# Patient Record
Sex: MALE | Race: WHITE | NOT HISPANIC OR LATINO | Employment: OTHER | URBAN - METROPOLITAN AREA
[De-identification: names, ages, dates, MRNs, and addresses within clinical notes are randomized per-mention and may not be internally consistent; named-entity substitution may affect disease eponyms.]

---

## 2022-09-05 ENCOUNTER — HOSPITAL ENCOUNTER (OUTPATIENT)
Dept: RADIOLOGY | Facility: MEDICAL CENTER | Age: 77
End: 2022-09-05

## 2022-09-05 ENCOUNTER — APPOINTMENT (OUTPATIENT)
Dept: RADIOLOGY | Facility: MEDICAL CENTER | Age: 77
DRG: 357 | End: 2022-09-05
Attending: STUDENT IN AN ORGANIZED HEALTH CARE EDUCATION/TRAINING PROGRAM
Payer: COMMERCIAL

## 2022-09-05 ENCOUNTER — HOSPITAL ENCOUNTER (INPATIENT)
Facility: MEDICAL CENTER | Age: 77
LOS: 2 days | DRG: 357 | End: 2022-09-07
Attending: STUDENT IN AN ORGANIZED HEALTH CARE EDUCATION/TRAINING PROGRAM | Admitting: STUDENT IN AN ORGANIZED HEALTH CARE EDUCATION/TRAINING PROGRAM
Payer: COMMERCIAL

## 2022-09-05 PROBLEM — E78.5 DYSLIPIDEMIA: Status: ACTIVE | Noted: 2022-09-05

## 2022-09-05 PROBLEM — I10 HYPERTENSION: Status: ACTIVE | Noted: 2022-09-05

## 2022-09-05 PROBLEM — K92.2 LOWER GI BLEED: Status: ACTIVE | Noted: 2022-09-05

## 2022-09-05 LAB
ABO + RH BLD: NORMAL
ABO GROUP BLD: NORMAL
ANION GAP SERPL CALC-SCNC: 11 MMOL/L (ref 7–16)
BASOPHILS # BLD AUTO: 0.3 % (ref 0–1.8)
BASOPHILS # BLD: 0.04 K/UL (ref 0–0.12)
BLD GP AB SCN SERPL QL: NORMAL
BUN SERPL-MCNC: 13 MG/DL (ref 8–22)
CALCIUM SERPL-MCNC: 8.7 MG/DL (ref 8.5–10.5)
CFT BLD TEG: 3.8 MIN (ref 4.6–9.1)
CFT P HPASE BLD TEG: 3.7 MIN (ref 4.3–8.3)
CHLORIDE SERPL-SCNC: 100 MMOL/L (ref 96–112)
CLOT ANGLE BLD TEG: 77 DEGREES (ref 63–78)
CLOT LYSIS 30M P MA LENFR BLD TEG: 0.5 % (ref 0–2.6)
CO2 SERPL-SCNC: 23 MMOL/L (ref 20–33)
CREAT SERPL-MCNC: 0.59 MG/DL (ref 0.5–1.4)
CT.EXTRINSIC BLD ROTEM: 0.9 MIN (ref 0.8–2.1)
EOSINOPHIL # BLD AUTO: 0.07 K/UL (ref 0–0.51)
EOSINOPHIL NFR BLD: 0.6 % (ref 0–6.9)
ERYTHROCYTE [DISTWIDTH] IN BLOOD BY AUTOMATED COUNT: 44.8 FL (ref 35.9–50)
GFR SERPLBLD CREATININE-BSD FMLA CKD-EPI: 100 ML/MIN/1.73 M 2
GLUCOSE SERPL-MCNC: 130 MG/DL (ref 65–99)
HCT VFR BLD AUTO: 32.4 % (ref 42–52)
HGB BLD-MCNC: 10.9 G/DL (ref 14–18)
IMM GRANULOCYTES # BLD AUTO: 0.06 K/UL (ref 0–0.11)
IMM GRANULOCYTES NFR BLD AUTO: 0.5 % (ref 0–0.9)
INR PPP: 1.08 (ref 0.87–1.13)
LACTATE SERPL-SCNC: 1.8 MMOL/L (ref 0.5–2)
LYMPHOCYTES # BLD AUTO: 1.85 K/UL (ref 1–4.8)
LYMPHOCYTES NFR BLD: 15.8 % (ref 22–41)
MAGNESIUM SERPL-MCNC: 1.8 MG/DL (ref 1.5–2.5)
MCF BLD TEG: 63.2 MM (ref 52–69)
MCF.PLATELET INHIB BLD ROTEM: 22.2 MM (ref 15–32)
MCH RBC QN AUTO: 31.2 PG (ref 27–33)
MCHC RBC AUTO-ENTMCNC: 33.6 G/DL (ref 33.7–35.3)
MCV RBC AUTO: 92.8 FL (ref 81.4–97.8)
MONOCYTES # BLD AUTO: 0.68 K/UL (ref 0–0.85)
MONOCYTES NFR BLD AUTO: 5.8 % (ref 0–13.4)
NEUTROPHILS # BLD AUTO: 9.01 K/UL (ref 1.82–7.42)
NEUTROPHILS NFR BLD: 77 % (ref 44–72)
NRBC # BLD AUTO: 0 K/UL
NRBC BLD-RTO: 0 /100 WBC
PHOSPHATE SERPL-MCNC: 2.3 MG/DL (ref 2.5–4.5)
PLATELET # BLD AUTO: 258 K/UL (ref 164–446)
PMV BLD AUTO: 8.5 FL (ref 9–12.9)
POTASSIUM SERPL-SCNC: 3.8 MMOL/L (ref 3.6–5.5)
PROTHROMBIN TIME: 13.8 SEC (ref 12–14.6)
RBC # BLD AUTO: 3.49 M/UL (ref 4.7–6.1)
RH BLD: NORMAL
SODIUM SERPL-SCNC: 134 MMOL/L (ref 135–145)
TEG ALGORITHM TGALG: ABNORMAL
WBC # BLD AUTO: 11.7 K/UL (ref 4.8–10.8)

## 2022-09-05 PROCEDURE — 84100 ASSAY OF PHOSPHORUS: CPT

## 2022-09-05 PROCEDURE — 85576 BLOOD PLATELET AGGREGATION: CPT

## 2022-09-05 PROCEDURE — 86850 RBC ANTIBODY SCREEN: CPT

## 2022-09-05 PROCEDURE — 36245 INS CATH ABD/L-EXT ART 1ST: CPT | Mod: XS

## 2022-09-05 PROCEDURE — 86900 BLOOD TYPING SEROLOGIC ABO: CPT

## 2022-09-05 PROCEDURE — 99223 1ST HOSP IP/OBS HIGH 75: CPT | Mod: AI | Performed by: HOSPITALIST

## 2022-09-05 PROCEDURE — 04LB3DZ OCCLUSION OF INFERIOR MESENTERIC ARTERY WITH INTRALUMINAL DEVICE, PERCUTANEOUS APPROACH: ICD-10-PCS | Performed by: STUDENT IN AN ORGANIZED HEALTH CARE EDUCATION/TRAINING PROGRAM

## 2022-09-05 PROCEDURE — B4141ZZ FLUOROSCOPY OF SUPERIOR MESENTERIC ARTERY USING LOW OSMOLAR CONTRAST: ICD-10-PCS | Performed by: STUDENT IN AN ORGANIZED HEALTH CARE EDUCATION/TRAINING PROGRAM

## 2022-09-05 PROCEDURE — 83735 ASSAY OF MAGNESIUM: CPT

## 2022-09-05 PROCEDURE — 85347 COAGULATION TIME ACTIVATED: CPT

## 2022-09-05 PROCEDURE — 83605 ASSAY OF LACTIC ACID: CPT

## 2022-09-05 PROCEDURE — 85025 COMPLETE CBC W/AUTO DIFF WBC: CPT

## 2022-09-05 PROCEDURE — 770020 HCHG ROOM/CARE - TELE (206)

## 2022-09-05 PROCEDURE — 99153 MOD SED SAME PHYS/QHP EA: CPT

## 2022-09-05 PROCEDURE — 700105 HCHG RX REV CODE 258: Performed by: HOSPITALIST

## 2022-09-05 PROCEDURE — 700105 HCHG RX REV CODE 258: Performed by: STUDENT IN AN ORGANIZED HEALTH CARE EDUCATION/TRAINING PROGRAM

## 2022-09-05 PROCEDURE — 700111 HCHG RX REV CODE 636 W/ 250 OVERRIDE (IP)

## 2022-09-05 PROCEDURE — 75726 ARTERY X-RAYS ABDOMEN: CPT | Mod: XS

## 2022-09-05 PROCEDURE — 86901 BLOOD TYPING SEROLOGIC RH(D): CPT

## 2022-09-05 PROCEDURE — 700117 HCHG RX CONTRAST REV CODE 255: Performed by: STUDENT IN AN ORGANIZED HEALTH CARE EDUCATION/TRAINING PROGRAM

## 2022-09-05 PROCEDURE — 85384 FIBRINOGEN ACTIVITY: CPT

## 2022-09-05 PROCEDURE — 36415 COLL VENOUS BLD VENIPUNCTURE: CPT

## 2022-09-05 PROCEDURE — 80048 BASIC METABOLIC PNL TOTAL CA: CPT

## 2022-09-05 PROCEDURE — 85610 PROTHROMBIN TIME: CPT

## 2022-09-05 RX ORDER — OXYCODONE HYDROCHLORIDE 5 MG/1
2.5 TABLET ORAL
Status: DISCONTINUED | OUTPATIENT
Start: 2022-09-05 | End: 2022-09-07 | Stop reason: HOSPADM

## 2022-09-05 RX ORDER — ACETAMINOPHEN 325 MG/1
650 TABLET ORAL EVERY 6 HOURS PRN
Status: DISCONTINUED | OUTPATIENT
Start: 2022-09-05 | End: 2022-09-07 | Stop reason: HOSPADM

## 2022-09-05 RX ORDER — ONDANSETRON 4 MG/1
4 TABLET, ORALLY DISINTEGRATING ORAL EVERY 4 HOURS PRN
Status: DISCONTINUED | OUTPATIENT
Start: 2022-09-05 | End: 2022-09-07 | Stop reason: HOSPADM

## 2022-09-05 RX ORDER — SODIUM CHLORIDE 9 MG/ML
500 INJECTION, SOLUTION INTRAVENOUS ONCE
Status: COMPLETED | OUTPATIENT
Start: 2022-09-05 | End: 2022-09-05

## 2022-09-05 RX ORDER — MIDAZOLAM HYDROCHLORIDE 1 MG/ML
INJECTION INTRAMUSCULAR; INTRAVENOUS
Status: COMPLETED
Start: 2022-09-05 | End: 2022-09-05

## 2022-09-05 RX ORDER — OXYCODONE HYDROCHLORIDE 5 MG/1
5 TABLET ORAL
Status: DISCONTINUED | OUTPATIENT
Start: 2022-09-05 | End: 2022-09-06

## 2022-09-05 RX ORDER — BISACODYL 10 MG
10 SUPPOSITORY, RECTAL RECTAL
Status: DISCONTINUED | OUTPATIENT
Start: 2022-09-05 | End: 2022-09-07 | Stop reason: HOSPADM

## 2022-09-05 RX ORDER — POLYETHYLENE GLYCOL 3350 17 G/17G
1 POWDER, FOR SOLUTION ORAL
Status: DISCONTINUED | OUTPATIENT
Start: 2022-09-05 | End: 2022-09-07 | Stop reason: HOSPADM

## 2022-09-05 RX ORDER — MORPHINE SULFATE 4 MG/ML
2 INJECTION INTRAVENOUS
Status: DISCONTINUED | OUTPATIENT
Start: 2022-09-05 | End: 2022-09-06

## 2022-09-05 RX ORDER — IODIXANOL 270 MG/ML
80 INJECTION, SOLUTION INTRAVASCULAR ONCE
Status: COMPLETED | OUTPATIENT
Start: 2022-09-05 | End: 2022-09-05

## 2022-09-05 RX ORDER — SODIUM CHLORIDE, SODIUM LACTATE, POTASSIUM CHLORIDE, CALCIUM CHLORIDE 600; 310; 30; 20 MG/100ML; MG/100ML; MG/100ML; MG/100ML
INJECTION, SOLUTION INTRAVENOUS CONTINUOUS
Status: DISCONTINUED | OUTPATIENT
Start: 2022-09-05 | End: 2022-09-07

## 2022-09-05 RX ORDER — AMOXICILLIN 250 MG
2 CAPSULE ORAL 2 TIMES DAILY
Status: DISCONTINUED | OUTPATIENT
Start: 2022-09-05 | End: 2022-09-07 | Stop reason: HOSPADM

## 2022-09-05 RX ORDER — ONDANSETRON 2 MG/ML
4 INJECTION INTRAMUSCULAR; INTRAVENOUS EVERY 4 HOURS PRN
Status: DISCONTINUED | OUTPATIENT
Start: 2022-09-05 | End: 2022-09-07 | Stop reason: HOSPADM

## 2022-09-05 RX ORDER — LABETALOL HYDROCHLORIDE 5 MG/ML
10 INJECTION, SOLUTION INTRAVENOUS EVERY 4 HOURS PRN
Status: DISCONTINUED | OUTPATIENT
Start: 2022-09-05 | End: 2022-09-07 | Stop reason: HOSPADM

## 2022-09-05 RX ADMIN — SODIUM CHLORIDE 500 ML: 9 INJECTION, SOLUTION INTRAVENOUS at 17:55

## 2022-09-05 RX ADMIN — FENTANYL CITRATE 50 MCG: 50 INJECTION, SOLUTION INTRAMUSCULAR; INTRAVENOUS at 17:41

## 2022-09-05 RX ADMIN — SODIUM CHLORIDE, POTASSIUM CHLORIDE, SODIUM LACTATE AND CALCIUM CHLORIDE: 600; 310; 30; 20 INJECTION, SOLUTION INTRAVENOUS at 17:04

## 2022-09-05 RX ADMIN — MIDAZOLAM HYDROCHLORIDE 1 MG: 2 INJECTION, SOLUTION INTRAMUSCULAR; INTRAVENOUS at 17:41

## 2022-09-05 RX ADMIN — IODIXANOL 80 ML: 270 INJECTION, SOLUTION INTRAVASCULAR at 19:00

## 2022-09-05 ASSESSMENT — ENCOUNTER SYMPTOMS
RESPIRATORY NEGATIVE: 1
COUGH: 0
HEADACHES: 0
ABDOMINAL PAIN: 0
LOSS OF CONSCIOUSNESS: 0
HEARTBURN: 0
BLOOD IN STOOL: 1
WEIGHT LOSS: 0
DIZZINESS: 0
SHORTNESS OF BREATH: 0
CARDIOVASCULAR NEGATIVE: 1
BACK PAIN: 0
WEAKNESS: 1
MUSCULOSKELETAL NEGATIVE: 1
SPEECH CHANGE: 0
EYES NEGATIVE: 1
CONSTIPATION: 0
EYE DISCHARGE: 0
PALPITATIONS: 0
CHILLS: 0
NERVOUS/ANXIOUS: 0
BLURRED VISION: 0
STRIDOR: 0
NAUSEA: 0
DIARRHEA: 1
PSYCHIATRIC NEGATIVE: 1
FEVER: 0
VOMITING: 0

## 2022-09-05 ASSESSMENT — PATIENT HEALTH QUESTIONNAIRE - PHQ9
2. FEELING DOWN, DEPRESSED, IRRITABLE, OR HOPELESS: NOT AT ALL
1. LITTLE INTEREST OR PLEASURE IN DOING THINGS: NOT AT ALL
SUM OF ALL RESPONSES TO PHQ9 QUESTIONS 1 AND 2: 0

## 2022-09-05 ASSESSMENT — LIFESTYLE VARIABLES: SUBSTANCE_ABUSE: 0

## 2022-09-05 NOTE — CONSULTS
Gastroenterology Consult Note:    LIDIA Kruger  Date & Time note created:    9/5/2022   4:22 PM     Referring MD:  Dr. Orlin Sood    Patient ID:  Name:             Andrea Clayton   YOB: 1945  Age:                 76 y.o.  male   MRN:               [id number]                                                             Reason for Consult:      Painless hematochezia    History of Present Illness:    This is a 75 yo male PMH atrial fibrillation, HLD who [facility] developing painless hematochezia starting at 0300. He had 3 episodes of large amount of blood at home. Then [facility] Baseline Hgb: 12.5. Current Hgb: 11.5. CTA:  shows active extravasation in sigmoid colon, possible malignancy but unclear finding. He was transferred to [facility] for IR consultation and possible artery embolization. Since his transfer to [facility], he had another episode of painless hematochezia. Began to have lightheadedness. No syncope.  Dr. Leone, radiologist, reviewed the CTA and plans to do IR embolization. Takes a baby Aspirin for atrial fibrillation. Never had a colonoscopy. NO family history of colon cancer.  Denies fevers, chills, abdominal pain, melena.    Review of Systems:      Review of Systems   Constitutional:  Positive for malaise/fatigue.   HENT: Negative.     Eyes: Negative.    Respiratory: Negative.     Cardiovascular: Negative.    Gastrointestinal:  Positive for blood in stool.   Genitourinary: Negative.    Musculoskeletal: Negative.    Skin: Negative.    Neurological:  Positive for weakness.   Psychiatric/Behavioral: Negative.             Physical Exam:  Vitals/ General Appearance:   Weight/BMI: Body mass index is 23.17 kg/m².    Vitals:    09/05/22 1500   BP: 118/58   Pulse: 78   Resp: 17   Temp: 36.1 °C (96.9 °F)   TempSrc: Temporal   SpO2: 98%   Weight: 84.1 kg (185 lb 6.5 oz)     Oxygen Therapy:  Pulse Oximetry: 98 %, O2 Delivery  Device: None - Room Air    Physical Exam  Constitutional:       Appearance: Normal appearance.   HENT:      Head: Normocephalic and atraumatic.      Mouth/Throat:      Mouth: Mucous membranes are moist.   Eyes:      Extraocular Movements: Extraocular movements intact.      Pupils: Pupils are equal, round, and reactive to light.   Cardiovascular:      Rate and Rhythm: Normal rate and regular rhythm.      Pulses: Normal pulses.      Heart sounds: Normal heart sounds.   Pulmonary:      Effort: Pulmonary effort is normal.      Breath sounds: Normal breath sounds.   Abdominal:      General: Bowel sounds are normal. There is no distension.      Palpations: Abdomen is soft.      Tenderness: There is no abdominal tenderness.   Musculoskeletal:         General: Normal range of motion.      Cervical back: Normal range of motion and neck supple.   Skin:     General: Skin is warm and dry.      Capillary Refill: Capillary refill takes less than 2 seconds.      Coloration: Skin is pale.   Neurological:      General: No focal deficit present.      Mental Status: He is alert and oriented to person, place, and time.   Psychiatric:         Mood and Affect: Mood normal.         Behavior: Behavior normal.         Thought Content: Thought content normal.         Judgment: Judgment normal.       Past Medical History:   Past Medical History:   Diagnosis Date    Arrhythmia     Arthritis     Back pain     Cataract     Hypertension        Past Surgical History:  Past Surgical History:   Procedure Laterality Date    APPENDECTOMY      ARTHROPLASTY         Hospital Medications:    Current Facility-Administered Medications:     senna-docusate (PERICOLACE or SENOKOT S) 8.6-50 MG per tablet 2 Tablet, 2 Tablet, Oral, BID **AND** polyethylene glycol/lytes (MIRALAX) PACKET 1 Packet, 1 Packet, Oral, QDAY PRN **AND** magnesium hydroxide (MILK OF MAGNESIA) suspension 30 mL, 30 mL, Oral, QDAY PRN **AND** bisacodyl (DULCOLAX) suppository 10 mg, 10 mg,  Rectal, QDAY PRN, Ayush Nuñez D.O.    lactated ringers infusion, , Intravenous, Continuous, Ayush Nuñez D.O.    acetaminophen (Tylenol) tablet 650 mg, 650 mg, Oral, Q6HRS PRN, Ayush Nuñez D.O.    Notify provider if pain remains uncontrolled, , , CONTINUOUS **AND** Use the Numeric Rating Scale (NRS), Wilkes-Baker Faces (WBF), or FLACC on regular floors and Critical-Care Pain Observation Tool (CPOT) on ICUs/Trauma to assess pain, , , CONTINUOUS **AND** Pulse Ox, , , CONTINUOUS **AND** Pharmacy Consult Request ...Pain Management Review 1 Each, 1 Each, Other, PHARMACY TO DOSE **AND** If patient difficult to arouse and/or has respiratory depression (respiratory rate of 10 or less), stop any opiates that are currently infusing and call a Rapid Response., , , CONTINUOUS, Ayush Nuñez D.O.    oxyCODONE immediate-release (ROXICODONE) tablet 2.5 mg, 2.5 mg, Oral, Q3HRS PRN **OR** oxyCODONE immediate-release (ROXICODONE) tablet 5 mg, 5 mg, Oral, Q3HRS PRN **OR** morphine 4 MG/ML injection 2 mg, 2 mg, Intravenous, Q3HRS PRN, Ayush Nuñez D.O.    labetalol (NORMODYNE/TRANDATE) injection 10 mg, 10 mg, Intravenous, Q4HRS PRN, Ayush Nuñez D.O.    ondansetron (ZOFRAN) syringe/vial injection 4 mg, 4 mg, Intravenous, Q4HRS PRN, Ayush Nuñez D.O.    ondansetron (ZOFRAN ODT) dispertab 4 mg, 4 mg, Oral, Q4HRS PRN, Ayush Nuñez D.O.    Current Outpatient Medications:  Current Facility-Administered Medications   Medication Dose Route Frequency Provider Last Rate Last Admin    senna-docusate (PERICOLACE or SENOKOT S) 8.6-50 MG per tablet 2 Tablet  2 Tablet Oral BID Ayush Nuñez D.O.        And    polyethylene glycol/lytes (MIRALAX) PACKET 1 Packet  1 Packet Oral QDAY PRN Ayush Nuñez D.O.        And    magnesium hydroxide (MILK OF MAGNESIA) suspension 30 mL  30 mL Oral QDAY PRN Ayush Nuñez D.O.        And    bisacodyl (DULCOLAX) suppository 10 mg  10 mg Rectal QDAY PRN Ayush Nuñez D.O.        lactated  ringers infusion   Intravenous Continuous Ayush Nuñez D.O.        acetaminophen (Tylenol) tablet 650 mg  650 mg Oral Q6HRS PRN Ayush Nuñez D.O.        Pharmacy Consult Request ...Pain Management Review 1 Each  1 Each Other PHARMACY TO DOSE Ayush Nuñez D.O.        oxyCODONE immediate-release (ROXICODONE) tablet 2.5 mg  2.5 mg Oral Q3HRS PRN Ayush Nuñez D.O.        Or    oxyCODONE immediate-release (ROXICODONE) tablet 5 mg  5 mg Oral Q3HRS PRN Ayush Nuñez D.O.        Or    morphine 4 MG/ML injection 2 mg  2 mg Intravenous Q3HRS PRN Ayush Nuñez D.O.        labetalol (NORMODYNE/TRANDATE) injection 10 mg  10 mg Intravenous Q4HRS PRN Ayush Nuñez D.O.        ondansetron (ZOFRAN) syringe/vial injection 4 mg  4 mg Intravenous Q4HRS PRN Ayush Nuñez D.O.        ondansetron (ZOFRAN ODT) dispertab 4 mg  4 mg Oral Q4HRS PRN Ayush Nuñez D.O.           Medication Allergy:  No Known Allergies    Family History:  No family history on file.    Social History:  Social History     Socioeconomic History    Marital status: Unknown     Spouse name: Not on file    Number of children: Not on file    Years of education: Not on file    Highest education level: Not on file   Occupational History    Not on file   Tobacco Use    Smoking status: Never    Smokeless tobacco: Never   Vaping Use    Vaping Use: Never used   Substance and Sexual Activity    Alcohol use: Never    Drug use: Never    Sexual activity: Not on file   Other Topics Concern    Not on file   Social History Narrative    Not on file     Social Determinants of Health     Financial Resource Strain: Not on file   Food Insecurity: Not on file   Transportation Needs: Not on file   Physical Activity: Not on file   Stress: Not on file   Social Connections: Not on file   Intimate Partner Violence: Not on file   Housing Stability: Not on file         MDM (Data Review):     Records reviewed and summarized in current documentation    Lab Data Review:  No  results found for this or any previous visit (from the past 24 hour(s)).    Imaging/Procedures Review:      OUTSIDE IMAGES-CT ABDOMEN /PELVIS   Final Result           MDM (Assessment and Plan):     Patient Active Problem List    Diagnosis Date Noted    Lower GI bleed 09/05/2022    Hypertension 09/05/2022    Dyslipidemia 09/05/2022     This is a pleasant 75 yo male who [facility] a sudden onset of painless hematochezia starting at 0300. Had 3 episodes of rectal bleeding at home and [facility] Hgb trended down from 12.5-->11.5. CTA  shows active extravasation of contrast in sigmoid colon, possible malignancy. He had one more episode of painless rectal bleeding with some clots. Dr. Leone, radiologist reviewed the CTA and plan to do IR embolization. No history of rectal bleeding. Denies fevers, chills, N/V, abdominal pain. Never had a colonoscopy.    ASSESSMENT:  Painless hematochezia  Anemia  Atrial fibrillation    PLAN:  Recommend IR embolization, Dr. Leone has reviewed CTA.  Plan to do a colonoscopy in the next 1-2 days after bleeding subsides for further evaluation. Likely will do colonoscopy Wednesday 9/7/22  Clear liquids, no reds    Trend Hgb and transfuse >7    Thank your for the opportunity to assist in the care of your patient.  Please call for any questions or concerns.    GEOFF Kruger.

## 2022-09-05 NOTE — H&P
Hospital Medicine History & Physical Note    Date of Service  9/5/2022    Primary Care Physician  No primary care provider on file.    Consultants  GI and Interventional Radiology    Specialist Names: Dr Cheek and Dr SIDDHARTHA Leone    Code Status  Full Code    Chief Complaint  Bloody diarrhea on multiple episodes overnight    History of Presenting Illness  Andrea Clayton is a 76 y.o. male  with a history of dyslipidemia, hypertension and paroxysmal atrial fibrillation.  He presented 9/5/2022 with after being seen at [facility] for acute bloody diarrhea throughout the night.  He was transferred to [facility] for higher level of care with potential need for interventional radiology.  Patient denies eating anything out of the ordinary he lives with his wife who is not ill.  The patient has not had any nausea vomiting nor abdominal pain.  He denies any constipation or straining recently.  He has not been on any antibiotics.  The patient is not a drinker of alcohol and he has not been on any NSAIDs.  He is not on any anticoagulants other than to daily baby aspirin.  The patient has never had a colonoscopy and has refused fecal [facility]  Patient denies any abnormal weight loss.  Patient states that last night he was awoken went to the bathroom and had bright red blood he had several episodes of controlled bloody diarrhea sometimes with clots.  This morning since this is ongoing he went to the [facility].  Initial hemoglobin was approximately 13 and on a repeat hemoglobin was down to 12.  The patient did receive IV fluids and also a CT abdomen pelvis which showed concern of active bleeding and potential mass versus thrombus in the sigmoid region.  He was transferred to [facility] for potential need of embolization with interventional radiology.  Patient is alert oriented and able to ambulate from bathroom to bed.  He did have one episode of lightheadedness after his most  recent bowel movement here at [facility] with bloody diarrhea.    I discussed the plan of care with patient.    Review of Systems  Review of Systems   Constitutional:  Negative for chills, fever, malaise/fatigue and weight loss.   Eyes:  Negative for blurred vision and discharge.   Respiratory:  Negative for cough, shortness of breath and stridor.    Cardiovascular:  Negative for chest pain, palpitations and leg swelling.   Gastrointestinal:  Positive for blood in stool and diarrhea. Negative for abdominal pain, constipation, heartburn, nausea and vomiting.   Genitourinary:  Negative for dysuria and hematuria.   Musculoskeletal:  Negative for back pain and joint pain.   Skin:  Negative for rash.   Neurological:  Negative for dizziness, speech change, loss of consciousness and headaches.   Psychiatric/Behavioral:  Negative for substance abuse. The patient is not nervous/anxious.      Past Medical History   has a past medical history of Arrhythmia, Arthritis, Back pain, Cataract, and Hypertension.    Surgical History   has a past surgical history that includes appendectomy and arthroplasty. Lipoma removed.    Family History  Father  from diabetes and Stroke in his 40's  Mother  of heart failure   Family history reviewed with patient. There is no family history that is pertinent to the chief complaint.     Social History  Quite smoking.  Occassional alcohol.  NO drugs.  Retired . .  Has 2 surviving of 3 children.  Went to [employer] and [employer].  LIves in [place]    Allergies  No Known Allergies    Medications  Prior to Admission Medications   Prescriptions Last Dose Informant Patient Reported? Taking?   Multiple Vitamins-Minerals (CENTRUM ADULTS PO)   Yes No   Sig: Take  by mouth.   PSYLLIUM PO   Yes No   Sig: Take  by mouth.   Saw Palmetto 160 MG Tab   Yes No   Sig: Take  by mouth.   ascorbic acid (ASCORBIC ACID) 500 MG Tab   Yes No   Sig: Take 500 mg by mouth every day.   aspirin EC  (ECOTRIN) 81 MG Tablet Delayed Response   Yes No   Sig: Take 81 mg by mouth every day.   doxycycline (VIBRAMYCIN) 100 MG Tab   No No   Sig: Take 1 Tab by mouth 2 times a day.   lisinopril (PRINIVIL) 20 MG Tab   Yes No   Sig: Take 20 mg by mouth every day.   metoprolol (LOPRESSOR) 25 MG Tab   Yes No   Sig: Take 25 mg by mouth 2 times a day.   rosuvastatin (CRESTOR) 10 MG Tab   Yes No   Sig: Take 10 mg by mouth every evening.      Facility-Administered Medications: None       Physical Exam  Temp:  [36.1 °C (96.9 °F)] 36.1 °C (96.9 °F)  Pulse:  [78] 78  Resp:  [17] 17  BP: (118)/(58) 118/58  SpO2:  [98 %] 98 %  Blood Pressure : 118/58   Temperature: 36.1 °C (96.9 °F)   Pulse: 78   Respiration: 17   Pulse Oximetry: 98 %       Physical Exam  Vitals reviewed.   Constitutional:       Appearance: Normal appearance. He is not diaphoretic.   HENT:      Head: Normocephalic and atraumatic.      Nose: Nose normal.      Mouth/Throat:      Mouth: Mucous membranes are moist.      Pharynx: No oropharyngeal exudate.   Eyes:      General: No scleral icterus.        Right eye: No discharge.         Left eye: No discharge.      Extraocular Movements: Extraocular movements intact.      Conjunctiva/sclera: Conjunctivae normal.      Pupils: Pupils are equal, round, and reactive to light.   Cardiovascular:      Rate and Rhythm: Regular rhythm. Tachycardia present.      Pulses:           Radial pulses are 2+ on the right side and 2+ on the left side.        Dorsalis pedis pulses are 2+ on the right side and 2+ on the left side.      Heart sounds: No murmur heard.  Pulmonary:      Effort: Pulmonary effort is normal. No respiratory distress.      Breath sounds: Normal breath sounds. No wheezing or rales.   Abdominal:      General: Bowel sounds are normal. There is no distension.      Palpations: Abdomen is soft.   Musculoskeletal:         General: No swelling or tenderness.      Cervical back: Neck supple. No tenderness. No muscular  tenderness.      Right lower leg: No edema.      Left lower leg: No edema.   Lymphadenopathy:      Cervical: No cervical adenopathy.   Skin:     Coloration: Skin is pale. Skin is not jaundiced.   Neurological:      General: No focal deficit present.      Mental Status: He is alert and oriented to person, place, and time. Mental status is at baseline.      Cranial Nerves: No cranial nerve deficit.   Psychiatric:         Mood and Affect: Mood normal.         Behavior: Behavior normal.       Laboratory:  Recent Labs     09/05/22  1601   WBC 11.7*   RBC 3.49*   HEMOGLOBIN 10.9*   HEMATOCRIT 32.4*   MCV 92.8   MCH 31.2   MCHC 33.6*   RDW 44.8   PLATELETCT 258   MPV 8.5*     Recent Labs     09/05/22  1601   SODIUM 134*   POTASSIUM 3.8   CHLORIDE 100   CO2 23   GLUCOSE 130*   BUN 13   CREATININE 0.59   CALCIUM 8.7     Recent Labs     09/05/22  1601   GLUCOSE 130*     Recent Labs     09/05/22  1601   INR 1.08     No results for input(s): NTPROBNP in the last 72 hours.      No results for input(s): TROPONINT in the last 72 hours.    Imaging:  OUTSIDE IMAGES-CT ABDOMEN /PELVIS   Final Result      IR-VISCERAL ANGIOGRAM - SELECTIVE    (Results Pending)         Assessment/Plan:  Justification for Admission Status  I anticipate this patient will require at least two midnights for appropriate medical management, necessitating inpatient admission because patient will require hemodynamics stability and verify no further GI bleed after embolization he will further need colonoscopy to rule out possible mass that was noted on CT    Patient will need a Telemetry bed on MEDICAL service .  The need is secondary to potential deterioration hemodynamically stable over next 24 hours consider transfer to medical floor.    * Lower GI bleed- (present on admission)  Assessment & Plan  [facility] abdomen [facility] pelvis uploaded to [facility] system.  I have discussed with Dr. Leone, radiology patient to go down for embolization.  I have consulted   Adia for further evaluation for potential mass.  Patient will be n.p.o. we will check every 8 hour hemoglobins  I will check a TEG as patient has been on aspirin if there is any gross abnormality consideration of platelets  And [facility]  Patient was wiling for blood transfusion if required  LR at 100 cc/h initiated    Dyslipidemia  Assessment & Plan  Patient is currently n.p.o. once able to take orals will restart his statin    Hypertension  Assessment & Plan  Due to potential blood loss and potential for hypotension I am holding off on lisinopril metoprolol for now  Monitor vitals      VTE prophylaxis: SCDs/TEDs and pharmacologic prophylaxis contraindicated due to GI bleed

## 2022-09-05 NOTE — PROGRESS NOTES
4 Eyes Skin Assessment Completed by QUINTEN Ellis and QUINTEN Shaffer.    Head WDL  Ears WDL  Nose WDL  Mouth WDL  Neck WDL  Breast/Chest WDL  Shoulder Blades WDL  Spine WDL  (R) Arm/Elbow/Hand WDL  (L) Arm/Elbow/Hand WDL  Abdomen WDL  Groin WDL  Scrotum/Coccyx/Buttocks WDL  (R) Leg WDL  (L) Leg WDL  (R) Heel/Foot/Toe WDL  (L) Heel/Foot/Toe WDL          Devices In Places Tele Box, Blood Pressure Cuff, Pulse Ox, and SCD's      Interventions In Place Heels Loaded W/Pillows and Pressure Redistribution Mattress    Possible Skin Injury No    Pictures Uploaded Into Epic N/A  Wound Consult Placed N/A  RN Wound Prevention Protocol Ordered Yes

## 2022-09-05 NOTE — PROGRESS NOTES
NORMAL     • Experiences normal transition Progressing    • Total weight loss less than 10% of birth weight Progressing [facility] TRIAGE OFFICER DIRECT ADMISSION REPORT  Transferring facility: [facility]  Transferring physician: Dr Miguel Rojo  Transferring facility/physician contact number: per RTOC  Chief complaint: blood per rectum  Pertinent history & patient course: 76 y M presents for acute blood per rectum starting earlier today. Hgb on presentation 12.5, repeat few hours later is 11.5. patient continues to have active bleeding. CTA shows active extravasation of contrast in sigmoid colon, possible malignancy but unclear finding there per radiology report. BP stable. Transferring hospitalist [facility] who both recommend IR consultation for possible artery embolization given active bleeding. [facility] available at [facility].   Pertinent imaging & lab results: as above  Code Status: FULL per transferring provider, I personally verified with the transferring provider patient's code status and the transferring provider has confirmed this with the patient.  Further work up or recommendations per triage officer prior to transfer: none  Consultants called prior to transfer and pertinent input from consultants: none  Patient accepted for transfer: Yes  Consultants to be called upon arrival: IR consultation for artery embolization for lower GI bleed  Admission status: Inpatient.   Floor requested: telemetry  If ICU transfer, name of intensivist case discussed with and pertinent input from critical care: n/a    Please inform the triage officer upon arrival of the patient to [facility] for assignment of a hospitalist to perform admission.     For any question or concerns regarding the care of this patient, please reach out to the assigned hospitalist.

## 2022-09-06 PROBLEM — I48.91 ATRIAL FIBRILLATION (HCC): Status: ACTIVE | Noted: 2022-09-06

## 2022-09-06 LAB
ALBUMIN SERPL BCP-MCNC: 3.2 G/DL (ref 3.2–4.9)
ALBUMIN/GLOB SERPL: 1.5 G/DL
ALP SERPL-CCNC: 90 U/L (ref 30–99)
ALT SERPL-CCNC: 11 U/L (ref 2–50)
ANION GAP SERPL CALC-SCNC: 11 MMOL/L (ref 7–16)
ANION GAP SERPL CALC-SCNC: 9 MMOL/L (ref 7–16)
AST SERPL-CCNC: 16 U/L (ref 12–45)
BILIRUB SERPL-MCNC: 0.3 MG/DL (ref 0.1–1.5)
BUN SERPL-MCNC: 12 MG/DL (ref 8–22)
BUN SERPL-MCNC: 14 MG/DL (ref 8–22)
CALCIUM SERPL-MCNC: 8.4 MG/DL (ref 8.5–10.5)
CALCIUM SERPL-MCNC: 8.5 MG/DL (ref 8.5–10.5)
CHLORIDE SERPL-SCNC: 101 MMOL/L (ref 96–112)
CHLORIDE SERPL-SCNC: 101 MMOL/L (ref 96–112)
CO2 SERPL-SCNC: 21 MMOL/L (ref 20–33)
CO2 SERPL-SCNC: 22 MMOL/L (ref 20–33)
CREAT SERPL-MCNC: 0.56 MG/DL (ref 0.5–1.4)
CREAT SERPL-MCNC: 0.62 MG/DL (ref 0.5–1.4)
ERYTHROCYTE [DISTWIDTH] IN BLOOD BY AUTOMATED COUNT: 47.3 FL (ref 35.9–50)
FERRITIN SERPL-MCNC: 608 NG/ML (ref 22–322)
GFR SERPLBLD CREATININE-BSD FMLA CKD-EPI: 102 ML/MIN/1.73 M 2
GFR SERPLBLD CREATININE-BSD FMLA CKD-EPI: 98 ML/MIN/1.73 M 2
GLOBULIN SER CALC-MCNC: 2.1 G/DL (ref 1.9–3.5)
GLUCOSE SERPL-MCNC: 107 MG/DL (ref 65–99)
GLUCOSE SERPL-MCNC: 142 MG/DL (ref 65–99)
HCT VFR BLD AUTO: 27.7 % (ref 42–52)
HGB BLD-MCNC: 8.6 G/DL (ref 14–18)
HGB BLD-MCNC: 9.2 G/DL (ref 14–18)
HGB BLD-MCNC: 9.8 G/DL (ref 14–18)
HGB RETIC QN AUTO: 35.2 PG/CELL (ref 29–35)
IMM RETICS NFR: 10.1 % (ref 9.3–17.4)
IRON SATN MFR SERPL: 45 % (ref 15–55)
IRON SERPL-MCNC: 88 UG/DL (ref 50–180)
MCH RBC QN AUTO: 31.5 PG (ref 27–33)
MCHC RBC AUTO-ENTMCNC: 33.2 G/DL (ref 33.7–35.3)
MCV RBC AUTO: 94.9 FL (ref 81.4–97.8)
PLATELET # BLD AUTO: 242 K/UL (ref 164–446)
PMV BLD AUTO: 8.6 FL (ref 9–12.9)
POTASSIUM SERPL-SCNC: 4 MMOL/L (ref 3.6–5.5)
POTASSIUM SERPL-SCNC: 4.1 MMOL/L (ref 3.6–5.5)
PROT SERPL-MCNC: 5.3 G/DL (ref 6–8.2)
RBC # BLD AUTO: 2.92 M/UL (ref 4.7–6.1)
RETICS # AUTO: 0.06 M/UL (ref 0.04–0.06)
RETICS/RBC NFR: 1.8 % (ref 0.8–2.1)
SODIUM SERPL-SCNC: 131 MMOL/L (ref 135–145)
SODIUM SERPL-SCNC: 134 MMOL/L (ref 135–145)
TIBC SERPL-MCNC: 196 UG/DL (ref 250–450)
UIBC SERPL-MCNC: 108 UG/DL (ref 110–370)
WBC # BLD AUTO: 11.2 K/UL (ref 4.8–10.8)

## 2022-09-06 PROCEDURE — 85018 HEMOGLOBIN: CPT | Mod: 91

## 2022-09-06 PROCEDURE — 82728 ASSAY OF FERRITIN: CPT

## 2022-09-06 PROCEDURE — 99233 SBSQ HOSP IP/OBS HIGH 50: CPT | Performed by: STUDENT IN AN ORGANIZED HEALTH CARE EDUCATION/TRAINING PROGRAM

## 2022-09-06 PROCEDURE — A9270 NON-COVERED ITEM OR SERVICE: HCPCS | Performed by: NURSE PRACTITIONER

## 2022-09-06 PROCEDURE — 700102 HCHG RX REV CODE 250 W/ 637 OVERRIDE(OP): Performed by: NURSE PRACTITIONER

## 2022-09-06 PROCEDURE — 85027 COMPLETE CBC AUTOMATED: CPT

## 2022-09-06 PROCEDURE — 80048 BASIC METABOLIC PNL TOTAL CA: CPT

## 2022-09-06 PROCEDURE — 80053 COMPREHEN METABOLIC PANEL: CPT

## 2022-09-06 PROCEDURE — 700105 HCHG RX REV CODE 258: Performed by: HOSPITALIST

## 2022-09-06 PROCEDURE — 83540 ASSAY OF IRON: CPT

## 2022-09-06 PROCEDURE — 85046 RETICYTE/HGB CONCENTRATE: CPT

## 2022-09-06 PROCEDURE — 83550 IRON BINDING TEST: CPT

## 2022-09-06 PROCEDURE — 770020 HCHG ROOM/CARE - TELE (206)

## 2022-09-06 RX ORDER — PEG-3350, SODIUM SULFATE, SODIUM CHLORIDE, POTASSIUM CHLORIDE, SODIUM ASCORBATE AND ASCORBIC ACID 7.5-2.691G
100 KIT ORAL 2 TIMES DAILY
Status: COMPLETED | OUTPATIENT
Start: 2022-09-06 | End: 2022-09-07

## 2022-09-06 RX ADMIN — PEG-3350, SODIUM SULFATE, SODIUM CHLORIDE, POTASSIUM CHLORIDE, SODIUM ASCORBATE AND ASCORBIC ACID 100 G: KIT at 15:57

## 2022-09-06 RX ADMIN — SODIUM CHLORIDE, POTASSIUM CHLORIDE, SODIUM LACTATE AND CALCIUM CHLORIDE: 600; 310; 30; 20 INJECTION, SOLUTION INTRAVENOUS at 15:46

## 2022-09-06 RX ADMIN — SODIUM CHLORIDE, POTASSIUM CHLORIDE, SODIUM LACTATE AND CALCIUM CHLORIDE: 600; 310; 30; 20 INJECTION, SOLUTION INTRAVENOUS at 05:44

## 2022-09-06 ASSESSMENT — ENCOUNTER SYMPTOMS
CARDIOVASCULAR NEGATIVE: 1
RESPIRATORY NEGATIVE: 1
PSYCHIATRIC NEGATIVE: 1
WEAKNESS: 1
BLOOD IN STOOL: 1
MUSCULOSKELETAL NEGATIVE: 1
WEIGHT LOSS: 1
GASTROINTESTINAL NEGATIVE: 1
EYES NEGATIVE: 1

## 2022-09-06 ASSESSMENT — COGNITIVE AND FUNCTIONAL STATUS - GENERAL
TOILETING: A LITTLE
DAILY ACTIVITIY SCORE: 23
MOBILITY SCORE: 23
STANDING UP FROM CHAIR USING ARMS: A LITTLE
SUGGESTED CMS G CODE MODIFIER MOBILITY: CI
SUGGESTED CMS G CODE MODIFIER DAILY ACTIVITY: CI

## 2022-09-06 ASSESSMENT — FIBROSIS 4 INDEX: FIB4 SCORE: 1.52

## 2022-09-06 NOTE — PROGRESS NOTES
Gastroenterology Consult Note:   Artur Arciniega M.D. with LIDIA Kruger  Date & Time note created:    9/6/2022   11:48 AM     Referring MD:  Dr. Orlin Sood    Patient ID:  Name:             Andrea Clayton   YOB: 1945  Age:                 76 y.o.  male   MRN:               [id number]                                                             Reason for Consult:      Painless hematochezia    History of Present Illness:    This is a 75 yo male PMH atrial fibrillation, HLD who [facility] developing painless hematochezia starting at 0300. He had 3 episodes of large amount of blood at home. Then [facility] Baseline Hgb: 12.5. Current Hgb: 11.5. CTA:  shows active extravasation in sigmoid colon, possible malignancy but unclear finding. He was transferred to [facility] for IR consultation and possible artery embolization. Since his transfer to [facility] beds at the [facility], he had another episode of painless hematochezia. Began to have lightheadedness. No syncope.  Dr. Leone, radiologist, reviewed the CTA and plans to do IR embolization. Takes a baby Aspirin for atrial fibrillation. Never had a colonoscopy. NO family history of colon cancer.  Denies fevers, chills, abdominal pain, melena.    INTERVAL HISTORY:    9/6/22: Stable. No acute overnight events. Went to IR for coil embolization. Since then, scant amount of dark blood. H/H stable. No abdominal pain      Review of Systems:      Review of Systems   Constitutional:  Positive for malaise/fatigue.   HENT: Negative.     Eyes: Negative.    Respiratory: Negative.     Cardiovascular: Negative.    Gastrointestinal:  Positive for blood in stool.   Genitourinary: Negative.    Musculoskeletal: Negative.    Skin: Negative.    Neurological:  Positive for weakness.   Psychiatric/Behavioral: Negative.             Physical Exam:  Vitals/ General Appearance:   Weight/BMI: Body mass index is 23.17 kg/m².    Vitals:     09/05/22 2041 09/05/22 2338 09/06/22 0411 09/06/22 0821   BP: 125/57 126/56 109/59 121/46   Pulse: 65 64 68 73   Resp: 16 16 16 18   Temp: 36.1 °C (96.9 °F) 36.1 °C (96.9 °F) 36.2 °C (97.2 °F) 36.1 °C (97 °F)   TempSrc: Temporal Temporal Temporal Temporal   SpO2: 93% 92% 96% 99%   Weight:         Oxygen Therapy:  Pulse Oximetry: 99 %, O2 (LPM): 4, O2 Delivery Device: None - Room Air    Physical Exam  Constitutional:       Appearance: Normal appearance.   HENT:      Head: Normocephalic and atraumatic.      Mouth/Throat:      Mouth: Mucous membranes are moist.   Eyes:      Extraocular Movements: Extraocular movements intact.      Pupils: Pupils are equal, round, and reactive to light.   Cardiovascular:      Rate and Rhythm: Normal rate and regular rhythm.      Pulses: Normal pulses.      Heart sounds: Normal heart sounds.   Pulmonary:      Effort: Pulmonary effort is normal.      Breath sounds: Normal breath sounds.   Abdominal:      General: Bowel sounds are normal. There is no distension.      Palpations: Abdomen is soft.      Tenderness: There is no abdominal tenderness.   Musculoskeletal:         General: Normal range of motion.      Cervical back: Normal range of motion and neck supple.   Skin:     General: Skin is warm and dry.      Capillary Refill: Capillary refill takes less than 2 seconds.      Coloration: Skin is pale.   Neurological:      General: No focal deficit present.      Mental Status: He is alert and oriented to person, place, and time.   Psychiatric:         Mood and Affect: Mood normal.         Behavior: Behavior normal.         Thought Content: Thought content normal.         Judgment: Judgment normal.       Past Medical History:   Past Medical History:   Diagnosis Date    Arrhythmia     Arthritis     Back pain     Cataract     Hypertension        Past Surgical History:  Past Surgical History:   Procedure Laterality Date    APPENDECTOMY      ARTHROPLASTY         Hospital Medications:    Current  Facility-Administered Medications:     peg-electrolyte solution (MOVIPREP) package 100 g, 100 g, Oral, BID, LIDIA Kruger    senna-docusate (PERICOLACE or SENOKOT S) 8.6-50 MG per tablet 2 Tablet, 2 Tablet, Oral, BID **AND** polyethylene glycol/lytes (MIRALAX) PACKET 1 Packet, 1 Packet, Oral, QDAY PRN **AND** magnesium hydroxide (MILK OF MAGNESIA) suspension 30 mL, 30 mL, Oral, QDAY PRN **AND** bisacodyl (DULCOLAX) suppository 10 mg, 10 mg, Rectal, QDAY PRN, Ayush Nuñez D.O.    lactated ringers infusion, , Intravenous, Continuous, Ayush Nuñez D.O., Last Rate: 100 mL/hr at 09/06/22 0544, New Bag at 09/06/22 0544    acetaminophen (Tylenol) tablet 650 mg, 650 mg, Oral, Q6HRS PRN, Ayush Nuñez D.O.    oxyCODONE immediate-release (ROXICODONE) tablet 2.5 mg, 2.5 mg, Oral, Q3HRS PRN **OR** [DISCONTINUED] oxyCODONE immediate-release (ROXICODONE) tablet 5 mg, 5 mg, Oral, Q3HRS PRN **OR** [DISCONTINUED] morphine 4 MG/ML injection 2 mg, 2 mg, Intravenous, Q3HRS PRN, Ayush Nuñez D.O.    labetalol (NORMODYNE/TRANDATE) injection 10 mg, 10 mg, Intravenous, Q4HRS PRN, Ayush Nuñez D.O.    ondansetron (ZOFRAN) syringe/vial injection 4 mg, 4 mg, Intravenous, Q4HRS PRN, Ayush Nuñez D.O.    ondansetron (ZOFRAN ODT) dispertab 4 mg, 4 mg, Oral, Q4HRS PRN, Ayush Nuñez D.O.    Current Outpatient Medications:  Current Facility-Administered Medications   Medication Dose Route Frequency Provider Last Rate Last Admin    peg-electrolyte solution (MOVIPREP) package 100 g  100 g Oral BID LIDIA Kruger        senna-docusate (PERICOLACE or SENOKOT S) 8.6-50 MG per tablet 2 Tablet  2 Tablet Oral BID Ayush Nuñez D.O.        And    polyethylene glycol/lytes (MIRALAX) PACKET 1 Packet  1 Packet Oral QDAY PRN Ayush Nuñez D.O.        And    magnesium hydroxide (MILK OF MAGNESIA) suspension 30 mL  30 mL Oral QDAY PRN Ayush Nuñez D.O.        And    bisacodyl (DULCOLAX) suppository 10 mg   10 mg Rectal QDAY PRN Ayush Nuñez D.O.        lactated ringers infusion   Intravenous Continuous Ayush Nuñez D.O. 100 mL/hr at 09/06/22 0544 New Bag at 09/06/22 0544    acetaminophen (Tylenol) tablet 650 mg  650 mg Oral Q6HRS PRN Ayush Nuñez D.O.        oxyCODONE immediate-release (ROXICODONE) tablet 2.5 mg  2.5 mg Oral Q3HRS PRN Ayush Nuñez D.O.        labetalol (NORMODYNE/TRANDATE) injection 10 mg  10 mg Intravenous Q4HRS PRN Ayush Nuñez D.O.        ondansetron (ZOFRAN) syringe/vial injection 4 mg  4 mg Intravenous Q4HRS PRN Ayush Nuñez D.O.        ondansetron (ZOFRAN ODT) dispertab 4 mg  4 mg Oral Q4HRS PRN Ayush Nuñez D.O.           Medication Allergy:  No Known Allergies    Family History:  No family history on file.    Social History:  Social History     Socioeconomic History    Marital status: Unknown     Spouse name: Not on file    Number of children: Not on file    Years of education: Not on file    Highest education level: Not on file   Occupational History    Not on file   Tobacco Use    Smoking status: Never    Smokeless tobacco: Never   Vaping Use    Vaping Use: Never used   Substance and Sexual Activity    Alcohol use: Never    Drug use: Never    Sexual activity: Not on file   Other Topics Concern    Not on file   Social History Narrative    Not on file     Social Determinants of Health     Financial Resource Strain: Not on file   Food Insecurity: Not on file   Transportation Needs: Not on file   Physical Activity: Not on file   Stress: Not on file   Social Connections: Not on file   Intimate Partner Violence: Not on file   Housing Stability: Not on file         MDM (Data Review):     Records reviewed and summarized in current documentation    Lab Data Review:  Recent Results (from the past 24 hour(s))   CBC WITH DIFFERENTIAL    Collection Time: 09/05/22  4:01 PM   Result Value Ref Range    WBC 11.7 (H) 4.8 - 10.8 K/uL    RBC 3.49 (L) 4.70 - 6.10 M/uL    Hemoglobin 10.9 (L)  14.0 - 18.0 g/dL    Hematocrit 32.4 (L) 42.0 - 52.0 %    MCV 92.8 81.4 - 97.8 fL    MCH 31.2 27.0 - 33.0 pg    MCHC 33.6 (L) 33.7 - 35.3 g/dL    RDW 44.8 35.9 - 50.0 fL    Platelet Count 258 164 - 446 K/uL    MPV 8.5 (L) 9.0 - 12.9 fL    Neutrophils-Polys 77.00 (H) 44.00 - 72.00 %    Lymphocytes 15.80 (L) 22.00 - 41.00 %    Monocytes 5.80 0.00 - 13.40 %    Eosinophils 0.60 0.00 - 6.90 %    Basophils 0.30 0.00 - 1.80 %    Immature Granulocytes 0.50 0.00 - 0.90 %    Nucleated RBC 0.00 /100 WBC    Neutrophils (Absolute) 9.01 (H) 1.82 - 7.42 K/uL    Lymphs (Absolute) 1.85 1.00 - 4.80 K/uL    Monos (Absolute) 0.68 0.00 - 0.85 K/uL    Eos (Absolute) 0.07 0.00 - 0.51 K/uL    Baso (Absolute) 0.04 0.00 - 0.12 K/uL    Immature Granulocytes (abs) 0.06 0.00 - 0.11 K/uL    NRBC (Absolute) 0.00 K/uL   Basic Metabolic Panel    Collection Time: 09/05/22  4:01 PM   Result Value Ref Range    Sodium 134 (L) 135 - 145 mmol/L    Potassium 3.8 3.6 - 5.5 mmol/L    Chloride 100 96 - 112 mmol/L    Co2 23 20 - 33 mmol/L    Glucose 130 (H) 65 - 99 mg/dL    Bun 13 8 - 22 mg/dL    Creatinine 0.59 0.50 - 1.40 mg/dL    Calcium 8.7 8.5 - 10.5 mg/dL    Anion Gap 11.0 7.0 - 16.0   MAGNESIUM    Collection Time: 09/05/22  4:01 PM   Result Value Ref Range    Magnesium 1.8 1.5 - 2.5 mg/dL   PHOSPHORUS    Collection Time: 09/05/22  4:01 PM   Result Value Ref Range    Phosphorus 2.3 (L) 2.5 - 4.5 mg/dL   Prothrombin Time    Collection Time: 09/05/22  4:01 PM   Result Value Ref Range    PT 13.8 12.0 - 14.6 sec    INR 1.08 0.87 - 1.13   BASIC TEG    Collection Time: 09/05/22  4:01 PM   Result Value Ref Range    Reaction Time Initial-R 3.8 (L) 4.6 - 9.1 min    React Time Initial Hep 3.7 (L) 4.3 - 8.3 min    Clot Kinetics-K 0.9 0.8 - 2.1 min    Clot Angle-Angle 77.0 63.0 - 78.0 degrees    Maximum Clot Strength-MA 63.2 52.0 - 69.0 mm    TEG Functional Fibrinogen(MA) 22.2 15.0 - 32.0 mm    Lysis 30 minutes-LY30 0.5 0.0 - 2.6 %    TEG Algorithm Link Algorithm     LACTIC ACID    Collection Time: 09/05/22  4:01 PM   Result Value Ref Range    Lactic Acid 1.8 0.5 - 2.0 mmol/L   COD (Adult)    Collection Time: 09/05/22  4:01 PM   Result Value Ref Range    ABO Grouping Only O     Rh Grouping Only POS     Antibody Screen-Cod NEG    ESTIMATED GFR    Collection Time: 09/05/22  4:01 PM   Result Value Ref Range    GFR (CKD-EPI) 100 >60 mL/min/1.73 m 2   ABO Rh Confirm    Collection Time: 09/05/22  4:41 PM   Result Value Ref Range    ABO Rh Confirm O POS    CBC without Differential    Collection Time: 09/06/22 12:16 AM   Result Value Ref Range    WBC 11.2 (H) 4.8 - 10.8 K/uL    RBC 2.92 (L) 4.70 - 6.10 M/uL    Hemoglobin 9.2 (L) 14.0 - 18.0 g/dL    Hematocrit 27.7 (L) 42.0 - 52.0 %    MCV 94.9 81.4 - 97.8 fL    MCH 31.5 27.0 - 33.0 pg    MCHC 33.2 (L) 33.7 - 35.3 g/dL    RDW 47.3 35.9 - 50.0 fL    Platelet Count 242 164 - 446 K/uL    MPV 8.6 (L) 9.0 - 12.9 fL   Basic Metabolic Panel (BMP)    Collection Time: 09/06/22 12:16 AM   Result Value Ref Range    Sodium 131 (L) 135 - 145 mmol/L    Potassium 4.1 3.6 - 5.5 mmol/L    Chloride 101 96 - 112 mmol/L    Co2 21 20 - 33 mmol/L    Glucose 107 (H) 65 - 99 mg/dL    Bun 14 8 - 22 mg/dL    Creatinine 0.56 0.50 - 1.40 mg/dL    Calcium 8.4 (L) 8.5 - 10.5 mg/dL    Anion Gap 9.0 7.0 - 16.0   ESTIMATED GFR    Collection Time: 09/06/22 12:16 AM   Result Value Ref Range    GFR (CKD-EPI) 102 >60 mL/min/1.73 m 2   HGB (Hemoglobin) for 48 hours    Collection Time: 09/06/22  7:58 AM   Result Value Ref Range    Hemoglobin 9.8 (L) 14.0 - 18.0 g/dL   Comp Metabolic Panel    Collection Time: 09/06/22 10:26 AM   Result Value Ref Range    Sodium 134 (L) 135 - 145 mmol/L    Potassium 4.0 3.6 - 5.5 mmol/L    Chloride 101 96 - 112 mmol/L    Co2 22 20 - 33 mmol/L    Anion Gap 11.0 7.0 - 16.0    Glucose 142 (H) 65 - 99 mg/dL    Bun 12 8 - 22 mg/dL    Creatinine 0.62 0.50 - 1.40 mg/dL    Calcium 8.5 8.5 - 10.5 mg/dL    AST(SGOT) 16 12 - 45 U/L    ALT(SGPT) 11 2 -  50 U/L    Alkaline Phosphatase 90 30 - 99 U/L    Total Bilirubin 0.3 0.1 - 1.5 mg/dL    Albumin 3.2 3.2 - 4.9 g/dL    Total Protein 5.3 (L) 6.0 - 8.2 g/dL    Globulin 2.1 1.9 - 3.5 g/dL    A-G Ratio 1.5 g/dL   ESTIMATED GFR    Collection Time: 09/06/22 10:26 AM   Result Value Ref Range    GFR (CKD-EPI) 98 >60 mL/min/1.73 m 2       Imaging/Procedures Review:      OUTSIDE IMAGES-CT ABDOMEN /PELVIS   Final Result      IR-VISCERAL ANGIOGRAM - SELECTIVE    (Results Pending)        MDM (Assessment and Plan):     Patient Active Problem List    Diagnosis Date Noted    Lower GI bleed 09/05/2022    Hypertension 09/05/2022    Dyslipidemia 09/05/2022     This is a pleasant 77 yo male who [facility] with a sudden onset of painless hematochezia starting at 0300. Had 3 episodes of rectal bleeding at home and [facility]. Hgb trended down from 12.5-->11.5. CTA  shows active extravasation of contrast in sigmoid colon, possible malignancy. He had one more episode of painless rectal bleeding with some clots. Dr. Leone, radiologist reviewed the CTA and plan to do IR embolization. No history of rectal bleeding. Denies fevers, chills, N/V, abdominal pain. Never had a colonoscopy.    ASSESSMENT:  Painless hematochezia resolved after coil embolization  Anemia  Atrial fibrillation    PLAN:  Risks, benefits, and alternatives of colonoscopy procedures were discussed with patient and/or family member(s).  Consenting person(s) were given opportunities to ask questions and discuss other options.  Risks including but not limited to perforation, infection, bleeding, missed lesion(s), possible need for surgery(ies) and/or interventional radiology, possible need for repeat procedure(s) and/or additional testing, hospitalization possibly prolonged, cardiac and/or pulmonary event, aspiration, hypoxia, stroke, medication and/or anesthesia reaction, indefinite diagnosis, discomfort, unsuccessful and/or incomplete procedure,  ineffective therapy and/or persistent symptoms, damage to adjacent organs and/or vascular structures, and other adverse events possibly life-threatening.  Interactive discussion was undertaken with Layman's terms. I answered questions in full and to satisfaction.  Consenting person(s) stated understanding and acceptance of these risks, and wished to proceed.  Informed consent was given in clear state of mind.      Clear liquids, no reds    Trend Hgb and transfuse >7   Movie Prep today  NPO after midnight    Thank your for the opportunity to assist in the care of your patient.  Please call for any questions or concerns.    GI Attending -    Patient seen, examined, chart reviewed and case discussed and plan arrived at with A.P.R.N.  Agree with this note.    Patient's bleeding has dramatically improved after embolization.  Has had small amounts of bright red rectal bleeding and this is tapering off.  Denies abdominal pain.  Agree with plans for colonoscopy tomorrow to rule out other sources of hematochezia.    Artur Arciniega M.D.    Ashlyn James, JOSE [email or web address].

## 2022-09-06 NOTE — OR SURGEON
Immediate Post- Operative Note        Findings: Tiny sigmoid artery pseudoaneurysm      Procedure(s): Microcoil embolization of a distal sigmoid artery branch      Estimated Blood Loss: Less than 5 ml        Complications: None            9/5/2022     6:45 PM     Nadir Leone M.D.

## 2022-09-06 NOTE — ASSESSMENT & PLAN NOTE
[facility].  Status post IR microcoil embolization of a distal sigmoid artery branch on 9/5  Clear liquid diet, n.p.o. midnight  Hold chemical anticoagulation and antiplatelets for now  GI following, aim for colonoscopy on a.m.  Labs on a.m.

## 2022-09-06 NOTE — PROGRESS NOTES
IR Procedure Note:    Dr. Leone consented patient prior to procedure; all questions answered.    Site confirmed with imaging guidance by patient, physician, Hue Carl RT, and RN.     Dr. Leone completed a visceral angiogram with coil embolization of the sigmoid artery.  The patient tolerated the procedure well; ETCo2 with consistent waveform during the procedure.      AngioSeal, tegaderm and gauze applied to right groin, CDI and soft.  Patient alert, oriented and verbally appropriate post procedure, patient remained hemodynamically stable during procedure and transport, see flow sheet for vital signs.  Report given to QUINTEN Ellis.  RN transported patient to T729 with SaO2 monitor @ 95 % on oxygen via NC on  4 lpm.     Procedure End Time: 1841    Angioseal Deployment time:1839  Terumo AngioSeal VIP Vascular Closure Device, 6F, REF [id number], LOT [id number]    Sigmoid artery embolization:  Penumbra Holly Coil LP, 2 mm x 2 cm,  REF [id number]. LOT [id number]    2. Penumbra Holly Coil LP, 2 mm x 4 cm, REF [id number], LOT [id number]    3. Penumbra Packing Coil LP 10 cm, REF [id number]., REF [id number]

## 2022-09-06 NOTE — CARE PLAN
The patient is Stable - Low risk of patient condition declining or worsening    Shift Goals  Clinical Goals: Safety  Patient Goals: Rest    Progress made toward(s) clinical / shift goals:      Problem: Knowledge Deficit - Standard  Goal: Patient and family/care givers will demonstrate understanding of plan of care, disease process/condition, diagnostic tests and medications  Outcome: Progressing  Note: Educated patient on POC, pain management, medication administration, ambulation, safety, diet, dressing care, monitoring for signs/ symptoms of bleeding, interventions in place to maintain/ improve skin integrity, monitoring input/ output, vital sign stability, continuous telemetry monitoring. Will continue to educate on POC accordingly.      Problem: Pain - Standard  Goal: Alleviation of pain or a reduction in pain to the patient’s comfort goal  Outcome: Progressing  Flowsheets (Taken 9/5/2022 1935)  Pain Rating Scale (NPRS): 0  Note: Provided extra pillows and blankets for support and repositioning. Will continue to assess and treat accordingly.

## 2022-09-06 NOTE — ASSESSMENT & PLAN NOTE
Due to potential blood loss and potential for hypotension I am holding off on lisinopril metoprolol for now  Monitor vitals

## 2022-09-06 NOTE — ASSESSMENT & PLAN NOTE
As per patient  Continue metoprolol  Chest vascular higher than 2, will likely need anticoagulation prior to discharge  [facility]

## 2022-09-07 ENCOUNTER — ANESTHESIA EVENT (OUTPATIENT)
Dept: SURGERY | Facility: MEDICAL CENTER | Age: 77
DRG: 357 | End: 2022-09-07
Payer: COMMERCIAL

## 2022-09-07 ENCOUNTER — ANESTHESIA (OUTPATIENT)
Dept: SURGERY | Facility: MEDICAL CENTER | Age: 77
DRG: 357 | End: 2022-09-07
Payer: COMMERCIAL

## 2022-09-07 VITALS
DIASTOLIC BLOOD PRESSURE: 61 MMHG | RESPIRATION RATE: 16 BRPM | HEART RATE: 65 BPM | OXYGEN SATURATION: 93 % | WEIGHT: 181.44 LBS | BODY MASS INDEX: 22.68 KG/M2 | SYSTOLIC BLOOD PRESSURE: 129 MMHG | TEMPERATURE: 98.6 F

## 2022-09-07 LAB
ALBUMIN SERPL BCP-MCNC: 3.2 G/DL (ref 3.2–4.9)
ALBUMIN/GLOB SERPL: 1.8 G/DL
ALP SERPL-CCNC: 81 U/L (ref 30–99)
ALT SERPL-CCNC: 11 U/L (ref 2–50)
ANION GAP SERPL CALC-SCNC: 9 MMOL/L (ref 7–16)
AST SERPL-CCNC: 19 U/L (ref 12–45)
BASOPHILS # BLD AUTO: 0.3 % (ref 0–1.8)
BASOPHILS # BLD: 0.03 K/UL (ref 0–0.12)
BILIRUB SERPL-MCNC: 0.3 MG/DL (ref 0.1–1.5)
BUN SERPL-MCNC: 9 MG/DL (ref 8–22)
CALCIUM SERPL-MCNC: 8.2 MG/DL (ref 8.5–10.5)
CHLORIDE SERPL-SCNC: 102 MMOL/L (ref 96–112)
CO2 SERPL-SCNC: 23 MMOL/L (ref 20–33)
CREAT SERPL-MCNC: 0.55 MG/DL (ref 0.5–1.4)
EOSINOPHIL # BLD AUTO: 0.15 K/UL (ref 0–0.51)
EOSINOPHIL NFR BLD: 1.5 % (ref 0–6.9)
ERYTHROCYTE [DISTWIDTH] IN BLOOD BY AUTOMATED COUNT: 45.7 FL (ref 35.9–50)
GFR SERPLBLD CREATININE-BSD FMLA CKD-EPI: 102 ML/MIN/1.73 M 2
GLOBULIN SER CALC-MCNC: 1.8 G/DL (ref 1.9–3.5)
GLUCOSE SERPL-MCNC: 98 MG/DL (ref 65–99)
HCT VFR BLD AUTO: 23.3 % (ref 42–52)
HGB BLD-MCNC: 7.9 G/DL (ref 14–18)
HGB BLD-MCNC: 8 G/DL (ref 14–18)
IMM GRANULOCYTES # BLD AUTO: 0.04 K/UL (ref 0–0.11)
IMM GRANULOCYTES NFR BLD AUTO: 0.4 % (ref 0–0.9)
LYMPHOCYTES # BLD AUTO: 2.45 K/UL (ref 1–4.8)
LYMPHOCYTES NFR BLD: 24.2 % (ref 22–41)
MAGNESIUM SERPL-MCNC: 1.7 MG/DL (ref 1.5–2.5)
MCH RBC QN AUTO: 31.3 PG (ref 27–33)
MCHC RBC AUTO-ENTMCNC: 33.9 G/DL (ref 33.7–35.3)
MCV RBC AUTO: 92.5 FL (ref 81.4–97.8)
MONOCYTES # BLD AUTO: 0.84 K/UL (ref 0–0.85)
MONOCYTES NFR BLD AUTO: 8.3 % (ref 0–13.4)
NEUTROPHILS # BLD AUTO: 6.6 K/UL (ref 1.82–7.42)
NEUTROPHILS NFR BLD: 65.3 % (ref 44–72)
NRBC # BLD AUTO: 0 K/UL
NRBC BLD-RTO: 0 /100 WBC
PATHOLOGY CONSULT NOTE: NORMAL
PHOSPHATE SERPL-MCNC: 2.5 MG/DL (ref 2.5–4.5)
PLATELET # BLD AUTO: 229 K/UL (ref 164–446)
PMV BLD AUTO: 8.5 FL (ref 9–12.9)
POTASSIUM SERPL-SCNC: 3.7 MMOL/L (ref 3.6–5.5)
PROT SERPL-MCNC: 5 G/DL (ref 6–8.2)
RBC # BLD AUTO: 2.52 M/UL (ref 4.7–6.1)
SODIUM SERPL-SCNC: 134 MMOL/L (ref 135–145)
WBC # BLD AUTO: 10.1 K/UL (ref 4.8–10.8)

## 2022-09-07 PROCEDURE — 83735 ASSAY OF MAGNESIUM: CPT

## 2022-09-07 PROCEDURE — 700105 HCHG RX REV CODE 258: Performed by: INTERNAL MEDICINE

## 2022-09-07 PROCEDURE — 00812 ANES LWR INTST SCR COLSC: CPT | Performed by: ANESTHESIOLOGY

## 2022-09-07 PROCEDURE — 160035 HCHG PACU - 1ST 60 MINS PHASE I: Performed by: INTERNAL MEDICINE

## 2022-09-07 PROCEDURE — A9270 NON-COVERED ITEM OR SERVICE: HCPCS | Performed by: NURSE PRACTITIONER

## 2022-09-07 PROCEDURE — 0DBH8ZX EXCISION OF CECUM, VIA NATURAL OR ARTIFICIAL OPENING ENDOSCOPIC, DIAGNOSTIC: ICD-10-PCS | Performed by: INTERNAL MEDICINE

## 2022-09-07 PROCEDURE — 88305 TISSUE EXAM BY PATHOLOGIST: CPT

## 2022-09-07 PROCEDURE — 700102 HCHG RX REV CODE 250 W/ 637 OVERRIDE(OP): Performed by: NURSE PRACTITIONER

## 2022-09-07 PROCEDURE — 85018 HEMOGLOBIN: CPT

## 2022-09-07 PROCEDURE — 0DBL8ZX EXCISION OF TRANSVERSE COLON, VIA NATURAL OR ARTIFICIAL OPENING ENDOSCOPIC, DIAGNOSTIC: ICD-10-PCS | Performed by: INTERNAL MEDICINE

## 2022-09-07 PROCEDURE — 99100 ANES PT EXTEME AGE<1 YR&>70: CPT | Performed by: ANESTHESIOLOGY

## 2022-09-07 PROCEDURE — 160048 HCHG OR STATISTICAL LEVEL 1-5: Performed by: INTERNAL MEDICINE

## 2022-09-07 PROCEDURE — 160002 HCHG RECOVERY MINUTES (STAT): Performed by: INTERNAL MEDICINE

## 2022-09-07 PROCEDURE — 99239 HOSP IP/OBS DSCHRG MGMT >30: CPT | Performed by: STUDENT IN AN ORGANIZED HEALTH CARE EDUCATION/TRAINING PROGRAM

## 2022-09-07 PROCEDURE — 85025 COMPLETE CBC W/AUTO DIFF WBC: CPT

## 2022-09-07 PROCEDURE — 160009 HCHG ANES TIME/MIN: Performed by: INTERNAL MEDICINE

## 2022-09-07 PROCEDURE — 160203 HCHG ENDO MINUTES - 1ST 30 MINS LEVEL 4: Performed by: INTERNAL MEDICINE

## 2022-09-07 PROCEDURE — 80053 COMPREHEN METABOLIC PANEL: CPT

## 2022-09-07 PROCEDURE — 700101 HCHG RX REV CODE 250: Performed by: ANESTHESIOLOGY

## 2022-09-07 PROCEDURE — 84100 ASSAY OF PHOSPHORUS: CPT

## 2022-09-07 PROCEDURE — 700111 HCHG RX REV CODE 636 W/ 250 OVERRIDE (IP): Performed by: ANESTHESIOLOGY

## 2022-09-07 RX ORDER — HYDROMORPHONE HYDROCHLORIDE 1 MG/ML
0.4 INJECTION, SOLUTION INTRAMUSCULAR; INTRAVENOUS; SUBCUTANEOUS
Status: DISCONTINUED | OUTPATIENT
Start: 2022-09-07 | End: 2022-09-07 | Stop reason: HOSPADM

## 2022-09-07 RX ORDER — SODIUM CHLORIDE, SODIUM LACTATE, POTASSIUM CHLORIDE, CALCIUM CHLORIDE 600; 310; 30; 20 MG/100ML; MG/100ML; MG/100ML; MG/100ML
INJECTION, SOLUTION INTRAVENOUS CONTINUOUS
Status: DISCONTINUED | OUTPATIENT
Start: 2022-09-07 | End: 2022-09-07 | Stop reason: HOSPADM

## 2022-09-07 RX ORDER — METOPROLOL TARTRATE 1 MG/ML
1 INJECTION, SOLUTION INTRAVENOUS
Status: DISCONTINUED | OUTPATIENT
Start: 2022-09-07 | End: 2022-09-07 | Stop reason: HOSPADM

## 2022-09-07 RX ORDER — SODIUM CHLORIDE, SODIUM LACTATE, POTASSIUM CHLORIDE, CALCIUM CHLORIDE 600; 310; 30; 20 MG/100ML; MG/100ML; MG/100ML; MG/100ML
INJECTION, SOLUTION INTRAVENOUS CONTINUOUS
Status: DISCONTINUED | OUTPATIENT
Start: 2022-09-07 | End: 2022-09-07

## 2022-09-07 RX ORDER — LIDOCAINE HYDROCHLORIDE 20 MG/ML
INJECTION, SOLUTION EPIDURAL; INFILTRATION; INTRACAUDAL; PERINEURAL PRN
Status: DISCONTINUED | OUTPATIENT
Start: 2022-09-07 | End: 2022-09-07 | Stop reason: HOSPADM

## 2022-09-07 RX ORDER — HYDRALAZINE HYDROCHLORIDE 20 MG/ML
5 INJECTION INTRAMUSCULAR; INTRAVENOUS
Status: DISCONTINUED | OUTPATIENT
Start: 2022-09-07 | End: 2022-09-07 | Stop reason: HOSPADM

## 2022-09-07 RX ORDER — LABETALOL HYDROCHLORIDE 5 MG/ML
5 INJECTION, SOLUTION INTRAVENOUS
Status: DISCONTINUED | OUTPATIENT
Start: 2022-09-07 | End: 2022-09-07 | Stop reason: HOSPADM

## 2022-09-07 RX ORDER — HYDROMORPHONE HYDROCHLORIDE 1 MG/ML
0.1 INJECTION, SOLUTION INTRAMUSCULAR; INTRAVENOUS; SUBCUTANEOUS
Status: DISCONTINUED | OUTPATIENT
Start: 2022-09-07 | End: 2022-09-07 | Stop reason: HOSPADM

## 2022-09-07 RX ORDER — HYDROMORPHONE HYDROCHLORIDE 1 MG/ML
0.2 INJECTION, SOLUTION INTRAMUSCULAR; INTRAVENOUS; SUBCUTANEOUS
Status: DISCONTINUED | OUTPATIENT
Start: 2022-09-07 | End: 2022-09-07 | Stop reason: HOSPADM

## 2022-09-07 RX ORDER — ONDANSETRON 2 MG/ML
4 INJECTION INTRAMUSCULAR; INTRAVENOUS
Status: DISCONTINUED | OUTPATIENT
Start: 2022-09-07 | End: 2022-09-07 | Stop reason: HOSPADM

## 2022-09-07 RX ORDER — DIPHENHYDRAMINE HYDROCHLORIDE 50 MG/ML
12.5 INJECTION INTRAMUSCULAR; INTRAVENOUS
Status: DISCONTINUED | OUTPATIENT
Start: 2022-09-07 | End: 2022-09-07 | Stop reason: HOSPADM

## 2022-09-07 RX ORDER — HALOPERIDOL 5 MG/ML
1 INJECTION INTRAMUSCULAR
Status: DISCONTINUED | OUTPATIENT
Start: 2022-09-07 | End: 2022-09-07 | Stop reason: HOSPADM

## 2022-09-07 RX ORDER — ALBUTEROL SULFATE 2.5 MG/3ML
2.5 SOLUTION RESPIRATORY (INHALATION)
Status: DISCONTINUED | OUTPATIENT
Start: 2022-09-07 | End: 2022-09-07 | Stop reason: HOSPADM

## 2022-09-07 RX ADMIN — PROPOFOL 50 MG: 10 INJECTION, EMULSION INTRAVENOUS at 07:32

## 2022-09-07 RX ADMIN — PEG-3350, SODIUM SULFATE, SODIUM CHLORIDE, POTASSIUM CHLORIDE, SODIUM ASCORBATE AND ASCORBIC ACID 100 G: KIT at 02:11

## 2022-09-07 RX ADMIN — SODIUM CHLORIDE, POTASSIUM CHLORIDE, SODIUM LACTATE AND CALCIUM CHLORIDE: 600; 310; 30; 20 INJECTION, SOLUTION INTRAVENOUS at 07:28

## 2022-09-07 RX ADMIN — LIDOCAINE HYDROCHLORIDE 100 MG: 20 INJECTION, SOLUTION EPIDURAL; INFILTRATION; INTRACAUDAL at 07:32

## 2022-09-07 ASSESSMENT — PAIN DESCRIPTION - PAIN TYPE: TYPE: SURGICAL PAIN

## 2022-09-07 ASSESSMENT — PAIN SCALES - GENERAL: PAIN_LEVEL: 0

## 2022-09-07 NOTE — PROGRESS NOTES
Monitor Summary:   SR 69-93  R PAC, R PVC, F PVC, O PVC  O trig   .15/.07/.39      Strip report per monitor room

## 2022-09-07 NOTE — DISCHARGE INSTRUCTIONS
Discharge Instructions per Indira Cobian M.D.    Please resume ASA tomorrow on 9/8/2022  Please follow-up with PCP and cardiology as outpatient  You have paroxysmal Afib. Please discuss with cardiology for anticoagulant use.     DIET: soft diet    ACTIVITY: As tolerated    DIAGNOSIS: GI bleeding, HTN, pAfib    Return to ER in the event of new or worsening symptoms. Please note importance of compliance and the patient has agreed to proceed with all medical recommendations and follow up plan indicated above. All medications come with benefits and risks. Risks may include permanent injury or death and these risks can be minimized with close reassessment and monitoring. Please make it to your scheduled follow ups with PCP, cardiology and GI    Discharge Instructions    Discharged to home by car with relative. Discharged via wheelchair, hospital escort: Yes.  Special equipment needed: Not Applicable    Be sure to schedule a follow-up appointment with your primary care doctor or any specialists as instructed.     Discharge Plan:   Diet Plan: Discussed  Activity Level: Discussed  Confirmed Follow up Appointment: Patient to Call and Schedule Appointment  Confirmed Symptoms Management: Discussed  Medication Reconciliation Updated: Yes    I understand that a diet low in cholesterol, fat, and sodium is recommended for good health. Unless I have been given specific instructions below for another diet, I accept this instruction as my diet prescription.   Other diet:      Special Instructions: None    -Is this patient being discharged with medication to prevent blood clots?  No    Is patient discharged on Warfarin / Coumadin?   No   Gastrointestinal Bleeding  Gastrointestinal (GI) bleeding is bleeding somewhere along the path that food travels through the body (digestive tract). This path is anywhere between the mouth and the opening of the butt (anus). You may have blood in your poop (stool) or have black poop. If you throw up  (vomit), there may be blood in it.  This condition can be mild, serious, or even life-threatening. If you have a lot of bleeding, you may need to stay in the hospital.  What are the causes?  This condition may be caused by:  Irritation and swelling of the esophagus (esophagitis). The esophagus is part of the body that moves food from your mouth to your stomach.  Swollen veins in the butt (hemorrhoids).  Areas of painful tearing in the opening of the butt (anal fissures). These are often caused by passing hard poop.  Pouches that form on the colon over time (diverticulosis).  Irritation and swelling (diverticulitis) in areas where pouches have formed on the colon.  Growths (polyps) or cancer. Colon cancer often starts out as growths that are not cancer.  Irritation of the stomach lining (gastritis).  Sores (ulcers) in the stomach.  What increases the risk?  You are more likely to develop this condition if you:  Have a certain type of infection in your stomach (Helicobacter pylori infection).  Take certain medicines.  Smoke.  Drink alcohol.  What are the signs or symptoms?  Common symptoms of this condition include:  Throwing up (vomiting) material that has bright red blood in it. It may look like coffee grounds.  Changes in your poop. The poop may:  Have red blood in it.  Be black, look like tar, and smell stronger than normal.  Be red.  Pain or cramping in the belly (abdomen).  How is this treated?  Treatment for this condition depends on the cause of the bleeding. For example:  Sometimes, the bleeding can be stopped during a procedure that is done to find the problem (endoscopy or colonoscopy).  Medicines can be used to:  Help control irritation, swelling, or infection.  Reduce acid in your stomach.  Certain problems can be treated with:  Creams.  Medicines that are put in the butt (suppositories).  Warm baths.  Surgery is sometimes needed.  If you lose a lot of blood, you may need a blood transfusion.  If bleeding  is mild, you may be allowed to go home. If there is a lot of bleeding, you will need to stay in the hospital.  Follow these instructions at home:    Take over-the-counter and prescription medicines only as told by your doctor.  Eat foods that have a lot of fiber in them. These foods include beans, whole grains, and fresh fruits and vegetables. You can also try eating 1-3 prunes each day.  Drink enough fluid to keep your pee (urine) pale yellow.  Keep all follow-up visits as told by your doctor. This is important.  Contact a doctor if:  Your symptoms do not get better.  Get help right away if:  Your bleeding does not stop.  You feel dizzy or you pass out (faint).  You feel weak.  You have very bad cramps in your back or belly.  You pass large clumps of blood (clots) in your poop.  Your symptoms are getting worse.  You have chest pain or fast heartbeats.  Summary  GI bleeding is bleeding somewhere along the path that food travels through the body (digestive tract).  This bleeding can be caused by many things. Treatment depends on the cause of the bleeding.  Take medicines only as told by your doctor.  Keep all follow-up visits as told by your doctor. This is important.  This information is not intended to replace advice given to you by your health care provider. Make sure you discuss any questions you have with your health care provider.  Document Released: 09/26/2009 Document Revised: 07/31/2019 Document Reviewed: 07/31/2019  [employer] Patient Education © 2020 [employer]

## 2022-09-07 NOTE — ANESTHESIA PREPROCEDURE EVALUATION
Case: [id number] Date/Time: 09/07/22 0715    Procedure: COLONOSCOPY    Location: [facility] ROOM 26 / SURGERY SAME DAY [facility]    Surgeons: Artur Arciniega M.D.          Relevant Problems   CARDIAC   (positive) Atrial fibrillation (HCC)   (positive) Hypertension       Physical Exam    Airway   Mallampati: II  TM distance: >3 FB  Neck ROM: full       Cardiovascular - normal exam  Rhythm: regular  Rate: normal  (-) murmur     Dental - normal exam           Pulmonary - normal exam  Breath sounds clear to auscultation     Abdominal    Neurological - normal exam                 Anesthesia Plan    ASA 2       Plan - MAC               Induction: intravenous      Pertinent diagnostic labs and testing reviewed    Informed Consent:    Anesthetic plan and risks discussed with patient.

## 2022-09-07 NOTE — ANESTHESIA TIME REPORT
Anesthesia Start and Stop Event Times     Date Time Event    9/7/2022 0718 Ready for Procedure     0728 Anesthesia Start     0801 Anesthesia Stop        Responsible Staff  09/07/22    Name Role Begin End    Candido Bojorquez M.D. Anesth 0728 0801        Overtime Reason:  no overtime (within assigned shift)    Comments:

## 2022-09-07 NOTE — DISCHARGE SUMMARY
Discharge Summary    CHIEF COMPLAINT ON ADMISSION  No chief complaint on file.      Reason for Admission  GI Bleed     Admission Date  9/5/2022    CODE STATUS  Full Code    HPI & HOSPITAL COURSE  This is a 76 y.o. male  with a history of dyslipidemia, hypertension and paroxysmal atrial fibrillation.  He presented 9/5/2022 with after being seen at [facility] for acute bloody diarrhea throughout the night.  He was transferred to [facility] for higher level of care with potential need for interventional radiology.     IR and GI were consulted. Patient underwent   Microcoil embolization of a distal sigmoid artery branch on 9/5 with finding of tiny sigmoid artery pseudoaneurysm. Patient underwent colonoscopy on 9/7/22 showing 2 mm sessile polyp resected with jumbo forceps in transverse colon and 2mm sessile polyp resected with jumbo forceps in sigmoid colon and Moderate diverticulosis. Patient tolerated procedure well. Hb stable. I spoke to GI, ok to resume ASA 81mg bid (home dose) on 9/8.   Of note, he has pAfib and [facility]. Per patient, he is prescribed with ASA 81mg bid. I have advised him to follow up with PCP and cardiology to further discuss Afib stroke prophylaxis managements. He voiced understanding.                                              Therefore, he is discharged in good and stable condition to home with close outpatient follow-up.    The patient met 2-midnight criteria for an inpatient stay at the time of discharge.    Discharge Date  9/7/2022    FOLLOW UP ITEMS POST DISCHARGE  PCP  Cardiology  GI    DISCHARGE DIAGNOSES  Principal Problem:    Lower GI bleed POA: Yes  Active Problems:    Hypertension POA: Yes    Dyslipidemia POA: Yes    Atrial fibrillation (HCC) POA: Yes  Resolved Problems:    * No resolved hospital problems. *      FOLLOW UP  No future appointments.  Vaughn Tavera M.D.  [address]  [facility] [address]  [phone]    Follow up in 1  week(s)        MEDICATIONS ON DISCHARGE     Medication List        CONTINUE taking these medications        Instructions   ascorbic acid 500 MG Tabs  Commonly known as: ascorbic acid   Take 500 mg by mouth every day.  Dose: 500 mg     aspirin EC 81 MG Tbec  Commonly known as: ECOTRIN   Take 81 mg by mouth every day.  Dose: 81 mg     CENTRUM ADULTS PO   Take  by mouth.     doxycycline 100 MG Tabs  Commonly known as: VIBRAMYCIN   Take 1 Tab by mouth 2 times a day.  Dose: 100 mg     lisinopril 20 MG Tabs  Commonly known as: PRINIVIL   Take 20 mg by mouth every day.  Dose: 20 mg     metoprolol tartrate 25 MG Tabs  Commonly known as: LOPRESSOR   Take 25 mg by mouth 2 times a day.  Dose: 25 mg     PSYLLIUM PO   Take  by mouth.     rosuvastatin 10 MG Tabs  Commonly known as: CRESTOR   Take 10 mg by mouth every evening.  Dose: 10 mg     Saw Palmetto 160 MG Tabs   Take  by mouth.              Allergies  No Known Allergies    DIET  Orders Placed This Encounter   Procedures    Diet Order Diet: Regular (high fiber)     Standing Status:   Standing     Number of Occurrences:   1     Order Specific Question:   Diet:     Answer:   Regular [1]     Comments:   high fiber       ACTIVITY  As tolerated.  Weight bearing as tolerated    CONSULTATIONS  GI  IR     PROCEDURES  S/p Microcoil embolization of a distal sigmoid artery branch 9/5/2022  S/p colonoscopy 9/7/2022    LABORATORY  Lab Results   Component Value Date    SODIUM 134 (L) 09/07/2022    POTASSIUM 3.7 09/07/2022    CHLORIDE 102 09/07/2022    CO2 23 09/07/2022    GLUCOSE 98 09/07/2022    BUN 9 09/07/2022    CREATININE 0.55 09/07/2022        Lab Results   Component Value Date    WBC 10.1 09/07/2022    HEMOGLOBIN 8.0 (L) 09/07/2022    HEMATOCRIT 23.3 (L) 09/07/2022    PLATELETCT 229 09/07/2022      IR-VISCERAL ANGIOGRAM - SELECTIVE   Final Result      1.  Inferior mesenteric arteriograms demonstrating a pseudoaneurysm in the marginal artery between the sigmoid and left colic  arteries.   2.  Technically successful microcoil embolization of a marginal artery pseudoaneurysm.   3.  Technically successfully Angio-Seal closure of the right common femoral arteriotomy.      OUTSIDE IMAGES-CT ABDOMEN /PELVIS   Final Result            Total time of the discharge process 33 minutes.

## 2022-09-07 NOTE — OR NURSING
0758 Patient arrived from OR to PACU 9. Connected to monitor and report received from anesthesia and RN. VSS. 3 L 02 via mask.  Breaths calm, even, unlabored. In Phase II    0803: Mask removed; on room air.    0815: Dr. Arciniega at bedside; updated pt.     0826: Report given to QUINTEN Barrios.    0835: Pt transferred to T 729 via gurney attached to monitor by RN. No personal belongings with pt.     0850: Pt arrived in room T729; transferred to bed and attached to tele monitor by FRANKIE. QUINTEN Barrios in room for handoff.

## 2022-09-07 NOTE — ANESTHESIA POSTPROCEDURE EVALUATION
Patient: Andrea Clayton    Procedure Summary     Date: 09/07/22 Room / Location: [facility] ROOM 26 / SURGERY SAME DAY [facility]    Anesthesia Start: 0728 Anesthesia Stop: 0801    Procedures:       COLONOSCOPY (Anus)      COLONOSCOPY, WITH BIOPSY (Anus) Diagnosis: (Diverticulosis, colon polyps)    Surgeons: Artur Arciniega M.D. Responsible Provider: Candido Bojorquez M.D.    Anesthesia Type: MAC ASA Status: 2          Final Anesthesia Type: MAC  Last vitals  BP   Blood Pressure : 134/60    Temp   36.3 °C (97.3 °F)    Pulse   63   Resp   16    SpO2   98 %      Anesthesia Post Evaluation    Patient location during evaluation: PACU  Patient participation: complete - patient participated  Level of consciousness: awake and alert  Pain score: 0    Airway patency: patent  Anesthetic complications: no  Cardiovascular status: hemodynamically stable  Respiratory status: acceptable  Hydration status: euvolemic    PONV: none          No notable events documented.     Nurse Pain Score: 0 (NPRS)

## 2022-09-07 NOTE — CARE PLAN
The patient is Stable - Low risk of patient condition declining or worsening    Shift Goals  Clinical Goals: Safety  Patient Goals: Rest    Progress made toward(s) clinical / shift goals:      Problem: Knowledge Deficit - Standard  Goal: Patient and family/care givers will demonstrate understanding of plan of care, disease process/condition, diagnostic tests and medications  Outcome: Progressing  Note: Educated patient on POC, pain management, medication administration, ambulation, safety, diet, rest, usage of call light, interventions in place to maintain/ improve skin integrity, moviprep, planned procedures, monitoring labs, monitoring input/ output, continuous telemetry monitoring. Will continue to educate on POC accordingly.      Problem: Pain - Standard  Goal: Alleviation of pain or a reduction in pain to the patient’s comfort goal  Outcome: Progressing  Flowsheets (Taken 9/6/2022 1930)  Pain Rating Scale (NPRS): 0  Note: Provided extra pillows and blankets for support and repositioning. Will continue to assess and treat accordingly.

## 2022-09-07 NOTE — OR SURGEON
Colonoscopy operative note    PreOp Diagnosis: Hematochezia, acute GI blood loss anemia      PostOp Diagnosis: Same      Procedure(s):  COLONOSCOPY - Wound Class: Clean Contaminated  COLONOSCOPY, WITH BIOPSY - Wound Class: Clean Contaminated    Surgeon(s):  Artur Arciniega M.D.    Anesthesiologist/Type of Anesthesia:  Anesthesiologist: Candido Bojorquez M.D./MAC    Surgical Staff:  Endoscopy Technician: Jacek Monterroso; González Duque; Charu Laguna  Endoscopy Nurse: Amelia Roman R.N.; Ubaldo Ochoa R.N.    Specimens removed if any:  ID Type Source Tests Collected by Time Destination   A : 2 mm polyp Polyp Cecum PATHOLOGY SPECIMEN Artur Arciniega M.D. 9/7/2022  7:42 AM    B : 2 mm polyp Polyp Colon - Transverse PATHOLOGY SPECIMEN Artur Arciniega M.D. 9/7/2022  7:49 AM        Dr. Arciniega  [facility]  ARACELIS Rivas  [phone]    Colonoscopy with: Forcep polypectomy x2    Indication:  Acute GI blood loss anemia with hematochezia requiring interventional radiology embolization    Sedation:  MAC Dr. Bojorquez    Findings:    GEREMIAS     Unremarkable    Terminal ileum     Unremarkable mucosa    Cecum      2 mm sessile polyp      Resected with jumbo forceps     Transverse colon      2 mm sessile polyp     Resected with jumbo forceps    Sigmoid colon     Moderate diverticulosis     No fresh or old blood seen     Most probable source of recent bleeding    Plan:   Follow CBC     Transfuse as needed to keep hemoglobin greater than or equal to 7     High-fiber diet avoid constipation     Follow-up pathology as outpatient     If no further signs of bleeding okay for discharge (recommend tomorrow given the patient's living in [place])     **  GI WILL SIGN OFF / STAND BY - PLEASE CALL IF ANY CONCERNS  **     Procedure detail:    Prior to procedure, informed consent was obtained.  Risks, benefits, alternatives, including but not limited to risk of bleeding, infection, perforation, adverse reaction to sedating  medicine, failure to identify pathology and death were explained to the patient who accepted all risks.    Patient was prepped in the left lateral position after IV propofol sedation was provided by anesthesia.    Digital rectal exam was performed and was unremarkable  Scope tip of the Olympus flexible adjustable colonoscope was passed from the rectum through to the cecum without difficulty.  Ileocecal valve, area behind the ileocecal valve and appendiceal orifice were well visualized.  In the cecum a 2 mm sessile polyp was identified.  This was completely resected with a single bite from a jumbo forceps and was sent for pathology.  Scope was retroflexed in the cecum demonstrating an unremarkable right colon.  Scope was straightened and the terminal ileum was intubated for approximately 10 cm and was unremarkable.  Scope was withdrawn back into the cecum and slowly withdrawn from the cecum through to the rectum visualizing the mucosa in a methodical 360 degree manner.  In the transverse colon a another 2 mm sessile polyp was identified and removed with cold jumbo forceps.  In the sigmoid colon moderate diverticulosis was encountered with some angulation but no new or old blood was identified.  No other source of bleeding was identified on this examination making the diverticulosis the most likely culprit.  Scope was further withdrawn to the rectum and retroflexed in the rectum demonstrating small internal hemorrhoids otherwise unremarkable.  Scope was straightened and air and liquid were suctioned.  Patient tolerated the procedure well and was sent to recovery without immediate complications.      9/7/2022 7:58 AM Artur Arciniega M.D.

## (undated) DEVICE — SENSOR OXIMETER ADULT SPO2 RD SET (20EA/BX)

## (undated) DEVICE — CONTAINER, SPECIMEN, STERILE

## (undated) DEVICE — WATER IRRIGATION STERILE 1000ML (12EA/CA)

## (undated) DEVICE — FORCEP RADIAL JAW 4 STANDARD CAPACITY W/NEEDLE 240CM (40EA/BX)

## (undated) DEVICE — TOWEL STOP TIMEOUT SAFETY FLAG (40EA/CA)

## (undated) DEVICE — SET EXTENSION WITH 2 PORTS (48EA/CA) ***PART #2C8610 IS A SUBSTITUTE*****

## (undated) DEVICE — TUBE CONNECTING SUCTION - CLEAR PLASTIC STERILE 72 IN (50EA/CA)

## (undated) DEVICE — KIT CUSTOM PROCEDURE SINGLE FOR ENDO  (15/CA)

## (undated) DEVICE — LACTATED RINGERS INJ 1000 ML - (14EA/CA 60CA/PF)

## (undated) DEVICE — MASK PANORAMIC OXYGEN PRO2 (30EA/CA)

## (undated) DEVICE — FILM CASSETTE ENDO

## (undated) DEVICE — CANISTER SUCTION RIGID RED 1500CC (40EA/CA)